# Patient Record
Sex: FEMALE | Race: WHITE
[De-identification: names, ages, dates, MRNs, and addresses within clinical notes are randomized per-mention and may not be internally consistent; named-entity substitution may affect disease eponyms.]

---

## 2017-02-02 ENCOUNTER — HOSPITAL ENCOUNTER (OUTPATIENT)
Dept: HOSPITAL 80 - FIMAGING | Age: 67
End: 2017-02-02
Attending: GENERAL ACUTE CARE HOSPITAL
Payer: COMMERCIAL

## 2017-02-02 DIAGNOSIS — Z12.31: Primary | ICD-10-CM

## 2017-02-02 DIAGNOSIS — Z80.3: ICD-10-CM

## 2017-02-02 PROCEDURE — G0202 SCR MAMMO BI INCL CAD: HCPCS

## 2017-02-02 NOTE — MA
Screening Digital Mammogram With Tomosynthesis



Clinical Indications: Routine screening. Other with breast cancer at age 68.



Technique:  Standard digital cephalocaudal and tomosynthesis mediolateral oblique projections are obt
ained.  The digital images were processed by the [email or web address] computer aided detection system. 



Comparison: December 2015, December 2014 and December 2013



Breast density: B; There are scattered fibroglandular densities.



Findings: CAD was reviewed.  No suspicious findings are identified.



Impression: Negative mammogram. BI-RADS 1. 



Recommendation:   Routine screening is recommended in one year.



[facility] will send a result letter to the patient.



Negative mammography should not preclude additional workup of a clinically suspicious finding.



The patient's information is entered into a reminder system with a target due date for her next mammo
gram.

## 2018-03-13 ENCOUNTER — HOSPITAL ENCOUNTER (OUTPATIENT)
Dept: HOSPITAL 80 - FIMAGING | Age: 68
End: 2018-03-13
Attending: INTERNAL MEDICINE
Payer: COMMERCIAL

## 2018-03-13 DIAGNOSIS — Z80.3: ICD-10-CM

## 2018-03-13 DIAGNOSIS — Z12.31: Primary | ICD-10-CM

## 2019-04-17 ENCOUNTER — HOSPITAL ENCOUNTER (OUTPATIENT)
Dept: HOSPITAL 80 - FIMAGING | Age: 69
End: 2019-04-17
Attending: INTERNAL MEDICINE
Payer: COMMERCIAL

## 2019-04-17 DIAGNOSIS — Z12.31: Primary | ICD-10-CM
